# Patient Record
Sex: FEMALE | Race: OTHER | URBAN - METROPOLITAN AREA
[De-identification: names, ages, dates, MRNs, and addresses within clinical notes are randomized per-mention and may not be internally consistent; named-entity substitution may affect disease eponyms.]

---
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
## 2023-02-14 ENCOUNTER — HOSPITAL ENCOUNTER (OUTPATIENT)
Age: 18
Discharge: HOME | End: 2023-02-14
Payer: COMMERCIAL

## 2023-02-14 VITALS
OXYGEN SATURATION: 100 % | TEMPERATURE: 100 F | HEART RATE: 102 BPM | DIASTOLIC BLOOD PRESSURE: 84 MMHG | RESPIRATION RATE: 18 BRPM | SYSTOLIC BLOOD PRESSURE: 136 MMHG

## 2023-02-14 DIAGNOSIS — U07.1 COVID-19: Primary | ICD-10-CM

## 2023-02-14 LAB
POCT INFLUENZA A: NEGATIVE
POCT INFLUENZA B: NEGATIVE
S PYO AG THROAT QL: NEGATIVE
SARS-COV-2 RNA RESP QL NAA+PROBE: DETECTED

## 2023-02-14 PROCEDURE — 87880 STREP A ASSAY W/OPTIC: CPT | Performed by: NURSE PRACTITIONER

## 2023-02-14 PROCEDURE — U0002 COVID-19 LAB TEST NON-CDC: HCPCS | Performed by: NURSE PRACTITIONER

## 2023-02-14 PROCEDURE — 87502 INFLUENZA DNA AMP PROBE: CPT | Performed by: NURSE PRACTITIONER

## 2023-02-14 PROCEDURE — 99213 OFFICE O/P EST LOW 20 MIN: CPT | Performed by: NURSE PRACTITIONER

## 2023-02-14 PROCEDURE — A9150 MISC/EXPER NON-PRESCRIPT DRU: HCPCS | Performed by: NURSE PRACTITIONER

## 2023-02-14 RX ORDER — ACETAMINOPHEN 500 MG
1000 TABLET ORAL ONCE
Status: COMPLETED | OUTPATIENT
Start: 2023-02-14 | End: 2023-02-14

## 2024-04-01 ENCOUNTER — HOSPITAL ENCOUNTER (EMERGENCY)
Facility: HOSPITAL | Age: 19
Discharge: HOME | End: 2024-04-01
Attending: EMERGENCY MEDICINE
Payer: COMMERCIAL

## 2024-04-01 ENCOUNTER — APPOINTMENT (OUTPATIENT)
Dept: GENERAL RADIOLOGY | Facility: HOSPITAL | Age: 19
End: 2024-04-01
Payer: COMMERCIAL

## 2024-04-01 ENCOUNTER — APPOINTMENT (OUTPATIENT)
Dept: GENERAL RADIOLOGY | Facility: HOSPITAL | Age: 19
End: 2024-04-01
Attending: EMERGENCY MEDICINE
Payer: COMMERCIAL

## 2024-04-01 VITALS
HEIGHT: 67 IN | OXYGEN SATURATION: 100 % | TEMPERATURE: 98 F | SYSTOLIC BLOOD PRESSURE: 127 MMHG | BODY MASS INDEX: 23.54 KG/M2 | WEIGHT: 150 LBS | RESPIRATION RATE: 16 BRPM | HEART RATE: 69 BPM | DIASTOLIC BLOOD PRESSURE: 67 MMHG

## 2024-04-01 DIAGNOSIS — M25.511 ACUTE PAIN OF RIGHT SHOULDER: Primary | ICD-10-CM

## 2024-04-01 PROCEDURE — 99283 EMERGENCY DEPT VISIT LOW MDM: CPT

## 2024-04-01 PROCEDURE — 73030 X-RAY EXAM OF SHOULDER: CPT | Performed by: EMERGENCY MEDICINE

## 2024-04-01 NOTE — ED INITIAL ASSESSMENT (HPI)
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

## 2024-04-02 NOTE — ED PROVIDER NOTES
Patient Seen in: [facility] Emergency Department    History     Chief Complaint   Patient presents with    Arm or Hand Injury       HPI    The patient presents to the ED complaining of right shoulder pain that started while she was working on a dance routine.  She states that pain keeps happening when she moves her arm and she has some tingling in her right hand.  No direct injury to the shoulder.    History reviewed.   Past Medical History:   Diagnosis Date    Asthma (HCC)        History reviewed. History reviewed. No pertinent surgical history.      Medications :  (Not in a hospital admission)       No family history on file.    Smoking Status:   Social History     Socioeconomic History    Marital status: Single   Tobacco Use    Smoking status: Never    Smokeless tobacco: Never   Vaping Use    Vaping Use: Never used   Substance and Sexual Activity    Alcohol use: Never    Drug use: Never       Constitutional and vital signs reviewed.      Social History and Family History elements reviewed from today, pertinent positives to the presenting problem noted.    Physical Exam     ED Triage Vitals [04/01/24 1723]   /88   Pulse 89   Resp 18   Temp 98.2 °F (36.8 °C)   Temp src Temporal   SpO2 100 %   O2 Device None (Room air)       All measures to prevent infection transmission during my interaction with the patient were taken. Handwashing was performed prior to and after the exam.  Stethoscope and any equipment used during my examination was cleaned with super sani-cloth germicidal wipes following the exam.     Physical Exam  Vitals and nursing note reviewed.   Constitutional:       General: She is not in acute distress.     Appearance: She is well-developed.   HENT:      Head: Normocephalic and atraumatic.   Neck:      Trachea: No tracheal deviation.   Cardiovascular:      Rate and Rhythm: Normal rate.      Pulses: Normal pulses.   Pulmonary:      Effort: Pulmonary effort is normal. No respiratory  distress.   Musculoskeletal:         General: Tenderness present. No deformity.      Comments: Point tenderness to the anterior right shoulder over the biceps tendon.  Palpation reproduces her pain.  Normal strength and sensation in the right hand   Skin:     General: Skin is warm and dry.   Neurological:      Mental Status: She is alert and oriented to person, place, and time.   Psychiatric:         Mood and Affect: Mood normal.         Behavior: Behavior normal.         ED Course      Labs Reviewed - No data to display    As Interpreted by me    Imaging Results Available and Reviewed while in ED: XR SHOULDER, COMPLETE (MIN 2 VIEWS), RIGHT (CPT=73030)    Result Date: 4/1/2024  CONCLUSION: Normal examination.     Dictated by (CST): Jamie Nava MD on 4/01/2024 at 6:19 PM     Finalized by (CST): Jamie Nava MD on 4/01/2024 at 6:20 PM         ED Medications Administered: Medications - No data to display      MDM     Vitals:    04/01/24 1723 04/01/24 1800   BP: 135/88 127/67   Pulse: 89 69   Resp: 18 16   Temp: 98.2 °F (36.8 °C)    TempSrc: Temporal    SpO2: 100% 100%   Weight: 68 kg    Height: 170.2 cm (5' 7\")      *I personally reviewed and interpreted all ED vitals.    Pulse Ox: 100%, Room air, Normal       Differential Diagnosis/ Diagnostic Considerations: Impingement, other    Complicating Factors: The patient already has does not have a problem list on file. to contribute to the complexity of this ED evaluation.    Medical Decision Making  The patient presents to the ED with right anterior shoulder pain likely impingement syndrome.  Nondistressed on exam and no concern for bony fracture.  Patient reassured and stable for discharge with RICE treatment.    Problems Addressed:  Acute pain of right shoulder: acute illness or injury    Risk  OTC drugs.        Condition upon leaving the department: Stable    Disposition and Plan     Clinical Impression:  1. Acute pain of right shoulder         Disposition:  Discharge    Follow-up:  [facility]  [address]  [facility] [address]  [phone]  Follow up  If symptoms worsen      Medications Prescribed:  There are no discharge medications for this patient.

## 2024-10-10 ENCOUNTER — HOSPITAL ENCOUNTER (OUTPATIENT)
Age: 19
Discharge: HOME | End: 2024-10-10
Payer: COMMERCIAL

## 2024-10-10 VITALS
TEMPERATURE: 98 F | HEART RATE: 78 BPM | RESPIRATION RATE: 18 BRPM | SYSTOLIC BLOOD PRESSURE: 131 MMHG | DIASTOLIC BLOOD PRESSURE: 85 MMHG | OXYGEN SATURATION: 100 %

## 2024-10-10 DIAGNOSIS — H61.23 BILATERAL IMPACTED CERUMEN: Primary | ICD-10-CM

## 2024-10-10 PROCEDURE — 99213 OFFICE O/P EST LOW 20 MIN: CPT | Performed by: NURSE PRACTITIONER

## 2024-10-10 NOTE — ED INITIAL ASSESSMENT (HPI)
Pt came in due to right ear pain for the past 3 days. Pt stated she has been cleaning her ears at home frequently using debrox kit OTC and is now having pain in right ear. Pt concerned for possible infection.

## 2024-10-10 NOTE — ED PROVIDER NOTES
Patient Seen in: [facility]      History     Chief Complaint   Patient presents with    Ear Pain     Stated Complaint: R ear pain    Subjective:   20 y/o female with asthma presents with c/o ear pain for the past 3 days.  Patient states has been using over-the-counter Debrox to try to clean out her right ear, unsuccessful.  Patient concern for possible ear infection.  Endorses had slight ringing in ear this morning.  Positive muffled hearing.  No drainage, ear injury, headache, dizziness              Objective:     Past Medical History:    Asthma (HCC)              History reviewed. No pertinent surgical history.             No pertinent social history.            Review of Systems   Constitutional:  Negative for chills and fever.   HENT:  Positive for ear pain. Negative for ear discharge.    Neurological:  Negative for dizziness, light-headedness and headaches.   All other systems reviewed and are negative.      Positive for stated complaint: R ear pain  Other systems are as noted in HPI.  Constitutional and vital signs reviewed.      All other systems reviewed and negative except as noted above.    Physical Exam     ED Triage Vitals [10/10/24 1443]   /85   Pulse 78   Resp 18   Temp 98 °F (36.7 °C)   Temp src Temporal   SpO2 100 %   O2 Device None (Room air)       Current Vitals:   Vital Signs  BP: 131/85  Pulse: 78  Resp: 18  Temp: 98 °F (36.7 °C)  Temp src: Temporal    Oxygen Therapy  SpO2: 100 %  O2 Device: None (Room air)        Physical Exam  Vitals and nursing note reviewed.   Constitutional:       General: She is not in acute distress.     Appearance: Normal appearance.   HENT:      Head: Normocephalic.      Right Ear: External ear normal. There is impacted cerumen.      Left Ear: External ear normal. There is impacted cerumen.      Ears:      Comments: Bilateral canals completely occluded with cerumen.  Unable to visualize bilateral TMs  Cardiovascular:      Rate and Rhythm: Normal rate  and regular rhythm.   Pulmonary:      Effort: Pulmonary effort is normal.      Breath sounds: Normal breath sounds.   Skin:     General: Skin is warm and dry.   Neurological:      Mental Status: She is alert and oriented to person, place, and time.   Psychiatric:         Behavior: Behavior is cooperative.             ED Course   Labs Reviewed - No data to display                MDM            Medical Decision Making  Patient is well-appearing.  I discussed differentials with patient including but not limited to otitis media, otitis externa, cerumen impaction  Bilateral ears irrigated with warm water and hydrogen peroxide.  Large cerumen plugs removed from both ears  Bilateral canals and TMs without infection  otc meds prn  Fu with PCP. Return/ ED precautions discussed      Problems Addressed:  Bilateral impacted cerumen: acute illness or injury        Disposition and Plan     Clinical Impression:  1. Bilateral impacted cerumen         Disposition:  Discharge  10/10/2024  4:07 pm    Follow-up:  [facility], [address] [id number] [phone]    or follow up with your Primary Doctor          Medications Prescribed:  There are no discharge medications for this patient.          Supplementary Documentation:

## 2025-03-21 ENCOUNTER — HOSPITAL ENCOUNTER (OUTPATIENT)
Age: 20
Discharge: HOME | End: 2025-03-21
Payer: COMMERCIAL

## 2025-03-21 VITALS
RESPIRATION RATE: 16 BRPM | DIASTOLIC BLOOD PRESSURE: 87 MMHG | OXYGEN SATURATION: 100 % | SYSTOLIC BLOOD PRESSURE: 129 MMHG | TEMPERATURE: 98 F | HEART RATE: 67 BPM

## 2025-03-21 DIAGNOSIS — S09.90XA INJURY OF HEAD, INITIAL ENCOUNTER: Primary | ICD-10-CM

## 2025-03-21 DIAGNOSIS — S06.0X0A CONCUSSION WITHOUT LOSS OF CONSCIOUSNESS, INITIAL ENCOUNTER: ICD-10-CM

## 2025-03-21 PROCEDURE — 99214 OFFICE O/P EST MOD 30 MIN: CPT | Performed by: NURSE PRACTITIONER

## 2025-03-21 RX ORDER — ACETAMINOPHEN 325 MG/1
650 TABLET ORAL ONCE
Status: COMPLETED | OUTPATIENT
Start: 2025-03-21 | End: 2025-03-21

## 2025-03-22 NOTE — ED INITIAL ASSESSMENT (HPI)
Pt states about 2-3 hours was in a dance class, pt states spun and states fell backward and hit her head. Pt states having a Ha since then, and light sensitivity.

## 2025-03-22 NOTE — ED PROVIDER NOTES
Patient Seen in: [facility]      History   No chief complaint on file.    Stated Complaint: Fall hit head    Subjective:   Well-appearing 20-year-old female with asthma presents with complaints of falling backwards and hitting her head 2 times while dancing at dance class that occurred 3 hours ago.  Patient denies LOC, nausea or vomiting since fall/head injury.  Patient communicate that she has some light sensitivity and a headache since fall.  Patient communicates that she was quickly assessed by her school , and was told that she probably has a minor concussion but should get reassessed.  Nothing has been taken for headache.  Patient denies other pain or injuries.  Aunt at bedside communicates that patient has been acting normal.  Patient communicates feeling well overall.            Objective:     Past Medical History:    Asthma (HCC)              History reviewed. No pertinent surgical history.             Social History     Socioeconomic History    Marital status: Single   Tobacco Use    Smoking status: Never    Smokeless tobacco: Never   Vaping Use    Vaping status: Never Used   Substance and Sexual Activity    Alcohol use: Never    Drug use: Never              Review of Systems    Positive for stated complaint: Fall hit head  Other systems are as noted in HPI.  Constitutional and vital signs reviewed.      All other systems reviewed and negative except as noted above.    Physical Exam     ED Triage Vitals [03/21/25 1902]   /87   Pulse 67   Resp 16   Temp 98 °F (36.7 °C)   Temp src Oral   SpO2 100 %   O2 Device None (Room air)       Current Vitals:   Vital Signs  BP: 129/87  Pulse: 67  Resp: 16  Temp: 98 °F (36.7 °C)  Temp src: Oral    Oxygen Therapy  SpO2: 100 %  O2 Device: None (Room air)        Physical Exam  VS: Vital signs reviewed. 02 saturation within normal limits for this patient.    General: Patient is awake and alert, oriented to person, place and time. Pt appears  non-toxic.     HEENT: Head is normocephalic, atraumatic. Pupils reactive bilaterally. Nonicteric sclera, no conjunctival injection. No facial droop or slurred speech. No oral lesions or pallor. Mucous membranes moist. Left and right tympanic membranes normal. NO hemotympanum.  No open or depressed skull deformity present.    Neck: Supple. Normal ROM.    Lungs: Good inspiratory effort.  No accessory muscle use or tachypnea.    Abdomen: Soft, nontender, non-distended.    Back: Normal inspection, no tenderness.      Cervical back: Normal.      Thoracic back: Normal.      Lumbar back: Normal.     Extremities: No focal swelling or tenderness. Capillary refill noted. The patient's motor strength is 5/5 and symmetric in upper and lower extremities bilaterally     Skin: Warm, dry and normal in color.     Psychiatric: Normal affect, judgement normal, insight normal.     CNS: Moves all 4 extremities. Interacts appropriately. No gait abnormality. Memory normal.        ED Course   Labs Reviewed - No data to display    MDM   Medical Decision Making  Well-appearing.  [place] CT Head Rule suggest a head CT is not necessary.  I discussed signs and symptoms for prompt ED eval with both patient and patient's aunt at bedside.  Acetaminophen was given in clinic for headache.  I discussed continuing over-the-counter acetaminophen as needed for head injury.  I discussed with patient that she should not return to activity until she is cleared by her PMD.  Differential diagnosis considered included subdural hematoma versus concussion versus skull fracture   PMD follow-up.  Return precautions discussed.      VThe [place] CT Head Rule suggests a head CT is not necessary     Problems Addressed:  Concussion without loss of consciousness, initial encounter: acute illness or injury  Injury of head, initial encounter: acute illness or injury    Risk  OTC drugs.        Disposition and Plan     Clinical Impression:  1. Injury of head, initial  encounter    2. Concussion without loss of consciousness, initial encounter         Disposition:  Discharge  3/21/2025  7:28 pm    Follow-up:  Katia Bradford MD  [address] [address]  [phone]    In 1 week            Medications Prescribed:  There are no discharge medications for this patient.          Supplementary Documentation:

## 2025-03-25 ENCOUNTER — TELEPHONE (OUTPATIENT)
Facility: CLINIC | Age: 20
End: 2025-03-25

## 2025-03-25 NOTE — TELEPHONE ENCOUNTER
Unable to add on this week.  Can use next available SDA.  Reviewed urgent care note patient with mild concussion okay to wait for next SDA    Katia Bradford MD, 03/25/25, 11:16 AM

## 2025-03-25 NOTE — TELEPHONE ENCOUNTER
Called patient in regards to message below ( identified name and date of birth )    Patient needs to be seen soon and appreciates     Appointment made     Future Appointments   Date Time Provider Department Center   3/27/2025  2:20 PM Mima Wilson APRN [facility]     Patient call transferred Patient  to update her insurance    Patient advised to arrive 15 minutes before her appointment to danial out forms, bring photo  ID and insurance  card     Patient verbalizes understanding and agrees with plan.

## 2025-03-25 NOTE — TELEPHONE ENCOUNTER
Incoming call from patient requesting a follow up within a week. Patient was at immediate care.     Please assist. Thank you.

## 2025-03-25 NOTE — TELEPHONE ENCOUNTER
All I can recommend if wants to establish with me is take my next SDA available and be placed on wait list for cancellation. Can also see other PCPs in [facility] network to establish care    Katia Bradford MD, 03/25/25, 1:00 PM

## 2025-03-25 NOTE — TELEPHONE ENCOUNTER
New Patient.Spoke with Patient and informed of Dr. Bradford's message.  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  Patient states she will need clearance before the competition.  Next SDA available in 4/14/25  Please advise.

## 2025-03-27 ENCOUNTER — LAB ENCOUNTER (OUTPATIENT)
Dept: LAB | Age: 20
End: 2025-03-27
Payer: COMMERCIAL

## 2025-03-27 ENCOUNTER — OFFICE VISIT (OUTPATIENT)
Dept: FAMILY MEDICINE CLINIC | Facility: CLINIC | Age: 20
End: 2025-03-27

## 2025-03-27 VITALS
HEART RATE: 76 BPM | BODY MASS INDEX: 23.86 KG/M2 | HEIGHT: 67 IN | WEIGHT: 152 LBS | SYSTOLIC BLOOD PRESSURE: 119 MMHG | DIASTOLIC BLOOD PRESSURE: 82 MMHG | OXYGEN SATURATION: 97 %

## 2025-03-27 DIAGNOSIS — S06.0X0D CONCUSSION WITHOUT LOSS OF CONSCIOUSNESS, SUBSEQUENT ENCOUNTER: ICD-10-CM

## 2025-03-27 DIAGNOSIS — S09.90XD INJURY OF HEAD, SUBSEQUENT ENCOUNTER: ICD-10-CM

## 2025-03-27 DIAGNOSIS — S09.90XD INJURY OF HEAD, SUBSEQUENT ENCOUNTER: Primary | ICD-10-CM

## 2025-03-27 LAB
ALBUMIN SERPL-MCNC: 4.5 G/DL (ref 3.2–4.8)
ALBUMIN/GLOB SERPL: 1.4 {RATIO} (ref 1–2)
ALP LIVER SERPL-CCNC: 63 U/L
ALT SERPL-CCNC: 28 U/L
ANION GAP SERPL CALC-SCNC: 5 MMOL/L (ref 0–18)
AST SERPL-CCNC: 22 U/L (ref ?–34)
BASOPHILS # BLD AUTO: 0.02 X10(3) UL (ref 0–0.2)
BASOPHILS NFR BLD AUTO: 0.3 %
BILIRUB SERPL-MCNC: 0.5 MG/DL (ref 0.3–1.2)
BUN BLD-MCNC: 14 MG/DL (ref 9–23)
BUN/CREAT SERPL: 16.3 (ref 10–20)
CALCIUM BLD-MCNC: 9.5 MG/DL (ref 8.7–10.4)
CHLORIDE SERPL-SCNC: 105 MMOL/L (ref 98–112)
CO2 SERPL-SCNC: 28 MMOL/L (ref 21–32)
CREAT BLD-MCNC: 0.86 MG/DL
DEPRECATED RDW RBC AUTO: 41.6 FL (ref 35.1–46.3)
EGFRCR SERPLBLD CKD-EPI 2021: 98 ML/MIN/1.73M2 (ref 60–?)
EOSINOPHIL # BLD AUTO: 0.09 X10(3) UL (ref 0–0.7)
EOSINOPHIL NFR BLD AUTO: 1.3 %
ERYTHROCYTE [DISTWIDTH] IN BLOOD BY AUTOMATED COUNT: 12 % (ref 11–15)
FASTING STATUS PATIENT QL REPORTED: YES
GLOBULIN PLAS-MCNC: 3.2 G/DL (ref 2–3.5)
GLUCOSE BLD-MCNC: 88 MG/DL (ref 70–99)
HCT VFR BLD AUTO: 40.1 %
HGB BLD-MCNC: 13.4 G/DL
IMM GRANULOCYTES # BLD AUTO: 0.01 X10(3) UL (ref 0–1)
IMM GRANULOCYTES NFR BLD: 0.1 %
LYMPHOCYTES # BLD AUTO: 2.3 X10(3) UL (ref 1–4)
LYMPHOCYTES NFR BLD AUTO: 33 %
MCH RBC QN AUTO: 31.1 PG (ref 26–34)
MCHC RBC AUTO-ENTMCNC: 33.4 G/DL (ref 31–37)
MCV RBC AUTO: 93 FL
MONOCYTES # BLD AUTO: 0.75 X10(3) UL (ref 0.1–1)
MONOCYTES NFR BLD AUTO: 10.8 %
NEUTROPHILS # BLD AUTO: 3.8 X10 (3) UL (ref 1.5–7.7)
NEUTROPHILS # BLD AUTO: 3.8 X10(3) UL (ref 1.5–7.7)
NEUTROPHILS NFR BLD AUTO: 54.5 %
OSMOLALITY SERPL CALC.SUM OF ELEC: 286 MOSM/KG (ref 275–295)
PLATELET # BLD AUTO: 276 10(3)UL (ref 150–450)
POTASSIUM SERPL-SCNC: 4.3 MMOL/L (ref 3.5–5.1)
PROT SERPL-MCNC: 7.7 G/DL (ref 5.7–8.2)
RBC # BLD AUTO: 4.31 X10(6)UL
SODIUM SERPL-SCNC: 138 MMOL/L (ref 136–145)
WBC # BLD AUTO: 7 X10(3) UL (ref 4–11)

## 2025-03-27 PROCEDURE — 3008F BODY MASS INDEX DOCD: CPT

## 2025-03-27 PROCEDURE — 3074F SYST BP LT 130 MM HG: CPT

## 2025-03-27 PROCEDURE — 36415 COLL VENOUS BLD VENIPUNCTURE: CPT

## 2025-03-27 PROCEDURE — 80053 COMPREHEN METABOLIC PANEL: CPT

## 2025-03-27 PROCEDURE — 99204 OFFICE O/P NEW MOD 45 MIN: CPT

## 2025-03-27 PROCEDURE — 85025 COMPLETE CBC W/AUTO DIFF WBC: CPT

## 2025-03-27 PROCEDURE — 3079F DIAST BP 80-89 MM HG: CPT

## 2025-03-27 NOTE — PROGRESS NOTES
Subjective:   Sam Coffey is a 20 year old female who presents for Follow - Up (Pt has been having, dizziness, nausea, headaches, and is light sensitive after her concussion. she states he symptoms have been progressing since UC visit on 3/21. ).     Patient is here for follow up from the urgent care for fall and hit her head and was diagnosed with injury of head and concussion without loss of consciousness patient is being treated with Tylenol.  Patient states symptoms are worse.     Patient states that she fell backwards and hit her head during her dance class.  Patient states that she did not lose consciousness or have nausea/vomiting at that time.  Patient now complains of having headache located located on the frontal lobe.  She states the headache was on the back of her head where she hit it originally that has now moved to the front.  Pain is constant and she describes the pain as a tightness throbbing pain.  She rates the pain as 6/10 and the pain radiates to her bilateral eyes.  Patient states that bright lights, loud noises, exercise any form of activity and movements make the pain worse.  Pain is relieved when she is laying down in a dark room.  Patient having extreme feelings of lightheadedness/dizziness with nausea.  Patient states she did not have CT scanning done at urgent care.      History/Other:      Chief Complaint Reviewed and Verified  Nursing Notes Reviewed and   Verified  Tobacco Reviewed  Allergies Reviewed  Medications Reviewed    Problem List Reviewed  Medical History Reviewed  Surgical History   Reviewed  OB Status Reviewed  Family History Reviewed  Social History   Reviewed           Tobacco:  She has never smoked tobacco.      No current outpatient medications on file.       Allergies[1]        Review of Systems   Constitutional:  Positive for fatigue. Negative for activity change.   HENT: Negative.  Negative for congestion, ear pain, rhinorrhea and sneezing.    Eyes:   Positive for pain. Negative for redness.   Respiratory: Negative.  Negative for cough, shortness of breath and wheezing.    Cardiovascular: Negative.  Negative for chest pain.   Gastrointestinal:  Positive for nausea. Negative for abdominal pain, constipation, diarrhea and vomiting.   Endocrine: Negative.    Genitourinary: Negative.  Negative for difficulty urinating and frequency.   Musculoskeletal: Negative.  Negative for back pain, joint swelling and myalgias.   Skin: Negative.  Negative for rash.   Allergic/Immunologic: Negative.    Neurological:  Positive for dizziness, light-headedness and headaches. Negative for syncope.   Hematological: Negative.    Psychiatric/Behavioral: Negative.           Objective:   /82 (BP Location: Right arm, Patient Position: Sitting, Cuff Size: adult)   Pulse 76   Ht 5' 7\" (1.702 m)   Wt 152 lb (68.9 kg)   LMP 03/20/2025 (Approximate)   SpO2 97%   BMI 23.81 kg/m²  Estimated body mass index is 23.81 kg/m² as calculated from the following:    Height as of this encounter: 5' 7\" (1.702 m).    Weight as of this encounter: 152 lb (68.9 kg).      Physical Exam  Vitals and nursing note reviewed.   Constitutional:       Appearance: Normal appearance. She is normal weight.   HENT:      Head: Normocephalic and atraumatic.      Right Ear: Tympanic membrane normal.      Left Ear: Tympanic membrane normal.      Nose: Nose normal.      Mouth/Throat:      Mouth: Mucous membranes are moist.      Pharynx: Oropharynx is clear.   Eyes:      Extraocular Movements: Extraocular movements intact.      Conjunctiva/sclera: Conjunctivae normal.      Pupils: Pupils are equal, round, and reactive to light.   Cardiovascular:      Rate and Rhythm: Normal rate and regular rhythm.      Pulses: Normal pulses.      Heart sounds: Normal heart sounds.   Pulmonary:      Effort: Pulmonary effort is normal.      Breath sounds: Normal breath sounds.   Abdominal:      General: Abdomen is flat. Bowel sounds are  normal.      Palpations: Abdomen is soft.   Musculoskeletal:         General: Normal range of motion.      Cervical back: Normal range of motion and neck supple.   Skin:     General: Skin is warm and dry.   Neurological:      General: No focal deficit present.      Mental Status: She is alert and oriented to person, place, and time. Mental status is at baseline.   Psychiatric:         Mood and Affect: Mood normal.         Behavior: Behavior normal.         Thought Content: Thought content normal.         Judgment: Judgment normal.           Assessment & Plan:     Assessment & Plan  Injury of head, subsequent encounter  -Order CT head, labs and referral given for neurologist  Advised patient to cut out caffeine/screen time.     Orders:    CT BRAIN OR HEAD (CPT=70450); Future    Neuro Referral - In Network    CBC With Differential With Platelet; Future    Comp Metabolic Panel (14); Future    Concussion without loss of consciousness, subsequent encounter  -Advised patient to cut out caffeine screen time  -Can do steam showers, humidifier in room, and have room quite and dark  -Ice pack to the back of the head as needed  -Rest, Tylenol/Ibuprofen as needed  Orders:    CT BRAIN OR HEAD (CPT=70450); Future    Neuro Referral - In Network    CBC With Differential With Platelet; Future    Comp Metabolic Panel (14); Future     Medication use, effects and side effects discussed in detail with patient. The patient indicated understanding of the diagnosis and agreed with the plan of care.    Return in about 2 weeks (around 4/10/2025).    NORBERTO Cortez       [1] No Known Allergies

## 2025-03-28 ENCOUNTER — TELEPHONE (OUTPATIENT)
Dept: NEUROLOGY | Facility: CLINIC | Age: 20
End: 2025-03-28

## 2025-03-28 ENCOUNTER — HOSPITAL ENCOUNTER (OUTPATIENT)
Dept: CT IMAGING | Facility: HOSPITAL | Age: 20
Discharge: HOME | End: 2025-03-28
Payer: COMMERCIAL

## 2025-03-28 ENCOUNTER — TELEPHONE (OUTPATIENT)
Dept: FAMILY MEDICINE CLINIC | Facility: CLINIC | Age: 20
End: 2025-03-28

## 2025-03-28 DIAGNOSIS — S06.0X0D CONCUSSION WITHOUT LOSS OF CONSCIOUSNESS, SUBSEQUENT ENCOUNTER: ICD-10-CM

## 2025-03-28 DIAGNOSIS — S09.90XD INJURY OF HEAD, SUBSEQUENT ENCOUNTER: Primary | ICD-10-CM

## 2025-03-28 DIAGNOSIS — S09.90XD INJURY OF HEAD, SUBSEQUENT ENCOUNTER: ICD-10-CM

## 2025-03-28 PROCEDURE — 70450 CT HEAD/BRAIN W/O DYE: CPT

## 2025-03-28 NOTE — TELEPHONE ENCOUNTER
Noted thank you.  When I put in the orders I did order as stat.  Unsure why did not show up.  Thank you for fixing.

## 2025-03-28 NOTE — TELEPHONE ENCOUNTER
NORBERTO Wilson - KINZA - issue has been resolved    Spoke to patient, full name and date of birth verified.  Patient has CT Brain or Head scheduled today at 2:30 PM.     Patient calling because she was informed that there is an authorization problem.   RN informed patient we will call her back.     RN called imaging prior-authorization team, spoke with Kyra.   The order placed on 3/27/25 by NORBERTO Wilson is showing as routine on their end, not STAT, but RN sees order as STAT in [facility].    New order is needed.     RN re-entered the order and Kyra confirmed it is now showing as STAT.     RN called Central Scheduling and spoke to Marjorie - the new order has been linked to patient's appointment today at 2:30 PM - okay for patient to proceed.     RN called patient back and informed her that we have corrected the problem with the order.   Patient grateful for the assistance, she will go to her appointment as planned today at 2:30 PM.

## 2025-03-31 NOTE — TELEPHONE ENCOUNTER
Pt is looking for sooner appt. Advised she had a ct scan already. Did add pt onto waitlist.. Pls advise.

## 2025-04-01 NOTE — TELEPHONE ENCOUNTER
Contacted pt and lvmtcb offered NPT appointment with Dr. Leggett on Wednesday 04/23 @ 8:45 AM. At [place]

## 2025-04-04 ENCOUNTER — TELEPHONE (OUTPATIENT)
Dept: FAMILY MEDICINE CLINIC | Facility: CLINIC | Age: 20
End: 2025-04-04

## 2025-04-04 NOTE — TELEPHONE ENCOUNTER
NORBERTO Wilson - please advise    Spoke to patient, full name and date of birth verified.  Patient reports that as of Tuesday 4/1/25, all of her symptoms are gone.     Patient denies dizziness, nausea, headaches, or changes to her vision.     RN recommended keeping the appointment with neurology due to known injury to the head. Explained that Dr. Leggett will also be able to review patient's CT brain/head that was completed 3/28/25 and give his professional opinion.     Patient verbalized understanding.   Patient would still like to know NROBERTO Wilson's opinion.

## 2025-04-04 NOTE — TELEPHONE ENCOUNTER
Patient was seen by Katie on 3/27/25 and said she is feeling much better. Patient has an appointment with  on 4/23/25. Patient would like to speak to Katie and ask her if she believes she still needs to keep that appointment since she is feel better? Please advise

## 2025-04-07 NOTE — TELEPHONE ENCOUNTER
Called patient,verified name and date of birth.  Reviewed results and recommendations from NORBERTO Wilson's 4/7/25 note below.   Patient verbalizes understanding and agrees to plan of care to see neurology as scheduled.

## 2025-04-07 NOTE — TELEPHONE ENCOUNTER
Patient advised to see neurologist, but if all symptoms have resolved then patient can wait to see neurologist.